# Patient Record
Sex: FEMALE | Race: WHITE | NOT HISPANIC OR LATINO | Employment: UNEMPLOYED | URBAN - METROPOLITAN AREA
[De-identification: names, ages, dates, MRNs, and addresses within clinical notes are randomized per-mention and may not be internally consistent; named-entity substitution may affect disease eponyms.]

---

## 2020-08-21 ENCOUNTER — HOSPITAL ENCOUNTER (OUTPATIENT)
Dept: OTHER | Facility: HOSPITAL | Age: 55
Discharge: HOME | End: 2020-08-21
Attending: NURSE PRACTITIONER

## 2020-09-21 ENCOUNTER — HOSPITAL ENCOUNTER (OUTPATIENT)
Dept: OTHER | Facility: HOSPITAL | Age: 55
Discharge: HOME | End: 2020-09-21
Attending: NURSE PRACTITIONER

## 2020-09-24 ENCOUNTER — HOSPITAL ENCOUNTER (OUTPATIENT)
Dept: PET IMAGING | Facility: HOSPITAL | Age: 55
Discharge: HOME | End: 2020-09-24
Attending: NURSE PRACTITIONER

## 2020-09-29 ENCOUNTER — OFFICE VISIT CONVERTED (OUTPATIENT)
Dept: PULMONOLOGY | Facility: CLINIC | Age: 55
End: 2020-09-29
Attending: INTERNAL MEDICINE

## 2021-05-28 VITALS
BODY MASS INDEX: 31.45 KG/M2 | RESPIRATION RATE: 10 BRPM | HEIGHT: 68 IN | TEMPERATURE: 97.9 F | HEART RATE: 88 BPM | DIASTOLIC BLOOD PRESSURE: 95 MMHG | SYSTOLIC BLOOD PRESSURE: 149 MMHG | WEIGHT: 207.5 LBS | OXYGEN SATURATION: 94 %

## 2021-05-28 NOTE — PROGRESS NOTES
Patient: YOLANDA WEST     Acct: [id number]     Report: #[id number]  UNIT #: [id number]     : 1965    Encounter Date:2020  PRIMARY CARE: GONZALO LOPEZ  ***Signed***  --------------------------------------------------------------------------------------------------------------------  Chief Complaint      Encounter Date      Sep 29, 2020            Primary Care Provider      GONZALO LOPEZ            Referring Provider      GONZALO LOPEZ            Patient Complaint      Patient is complaining of      New PT here today for 9 mm Pulmonary Nodule            VITALS      Height 5 ft 8 in / 172.72 cm      Weight 207 lbs 8 oz / 94.934902 kg      BSA 2.08 m2      BMI 31.5 kg/m2      Temperature 97.9 F / 36.61 C - Tympanic      Pulse 88      Respirations 10      Blood Pressure 149/95 Sitting, Left Arm      Pulse Oximetry 94%, room air            HPI      The patient is a 55 year old current every day smoker who presents to lung     clinic today for abnormal CT scan of the chest. Her primary care provider     ordered a noncontrast CT scan of the chest on 2020 showing a  9 mm nodule     in the left lung base. A follow up PET scan was ordered which was performed on     2020 showing non avid 0.8 X 0.8 lung nodule, emphysematous changes were     also seen. The patient is here today with her best friend, she is complaining of    feeling quite tired. She has not had a full work up for fatigue by her primary     care provider yet. She is due to see her shortly. The patient endorses excessive    daytime sleepiness and snoring but does not wake with a morning headaches or     fall asleep easily at red lights of when left alone watching TV.  She has a     chronic cough and shortness of breath with exertion, she is currently on no     inhaler regimen. She has no family history of lung cancer but father had     melanoma cancer and mother had brain cancer. She currently smokes 2 pack per     day,  she started smoking at age 15. She was able to successfully quit smoking     for 13 days with nicotine gum at one point and then restarted smoking.            ROS      Constitutional:  Complains of: Fatigue; Denies: Fever, Weight gain, Weight loss,    Chills, Insomnia, Other      Respiratory/Breathing:  Complains of: Shortness of air, Cough; Denies: Wheezing,    Hemoptysis, Pleuritic pain, Other      Endocrine:  Denies: Polydipsia, Polyuria, Heat/cold intolerance, Abnorml     menstrual pattern, Diabetes, Other      Eyes:  Denies: Blurred vision, Vision Changes, Other      Ears, nose, mouth, throat:  Complains of: Nasal or Sinus Pain, Nasal discharge,     Nasal congestion; Denies: Mouth lesions, Thrush, Throat pain, Hoarseness,     Allergies/Hay Fever, Post Nasal Drip, Headaches, Recent Head Injury, Nose     Bleeding, Neck Stiffness, Thyroid Mass, Hearing Loss, Ear Fullness, Dry Mouth,     Dry Lips, Other      Cardiovascular:  Complains of: Dyspnea on Exertion; Denies: Palpitations,     Syncope, Claudication, Chest Pain, Wake up Gasping for air, Leg Swelling,     Irregular Heart Rate, Cyanosis, Other      Gastrointestinal:  Complains of: Reflux/Heartburn; Denies: Nausea, Constipation,    Diarrhea, Abdominal pain, Vomiting, Difficulty Swallowing, Dysphagia, Jaundice,     Bloating, Melena, Bloody stools, Other      Genitourinary:  Complains of: Other (bladder leakage); Denies: Urinary     frequency, Incontinence, Hematuria, Urgency, Nocturia, Dysuria, Testicular     problems      Musculoskeletal:  Complains of: Joint Pain (Spinal Stenosis, back, knees);     Denies: Joint Stiffness, Joint Swelling, Myalgias, Other      Hematologic/lymphatic:  DENIES: Lymphadenopathy, Bruising, Bleeding tendencies,     Other      Neurological:  Denies: Headache, Numbness, Weakness, Seizures, Other      Psychiatric:  Complains of: Anxiety, Depression; Denies: Appropriate Effect,     Other      Sleep:  Yes: Excessive daytime sleep,  Snoring; No: Morning Headache?, Insomnia?,    Stop breathing at sleep?, Other      Integumentary:  Denies: Rash, Dry skin, Skin Warm to Touch, Other      Immunologic/Allergic:  Denies: Latex allergy, Seasonal allergies, Asthma,     Urticaria, Eczema, Other      Immunization status:  Up to date            FAMILY/SOCIAL/MEDICAL HX      Cancer/Type - Family Hx:  Mother (Brain), Father (Melanoma)      Is Father Still Living?:  No      Is Mother Still Living?:  No      Social History:  Tobacco Use; No Alcohol Use, No Recreational Drug use      Smoking status:  Current every day smoker (Pt started smoking at 15 yrs old, 2     ppd x 40 yrs)       Section:  Yes      Tubal Ligation:  No      Hysterectomy:  Yes      Anticoagulation Therapy:  No      Antibiotic Prophylaxis:  No      Medical History:  Yes: Depression, Anxiety, Reflux Disease, Shortness Of Breath,    Sinus Trouble      Psychiatric History      none            PREVENTION      Hx Influenza Vaccination:  Yes      Influenza Vaccine Declined:  No      2 or More Falls in Past Year?:  No      Fall Past Year with Injury?:  No      Hx Pneumococcal Vaccination:  No      Encouraged to follow-up with:  PCP regarding preventative exams.      Chart initiated by      Leatha Weller MA            ALLERGIES/MEDICATIONS      Allergies:        Coded Allergies:             Codeine (Verified  Allergy, Intermediate, 20)                  itching, rash, nausea           Morphine (Verified  Allergy, Intermediate, 20)                  itching, rash, nausea      Medications    Last Reconciled on 20 16:34 by KELLIE IYER DO      (prilosec otc) Unknown Strength  No Conflict Check               Reported         20       traZODone HCl (Desyrel) 50 Mg Tablet      100 MG PO HS, #60 TAB 0 Refills         Reported         20       Mirtazapine (Remeron) 15 Mg Tablet      30 MG PO QDAY, #60 TAB 0 Refills         Reported         20       clonazePAM (clonazePAM)  1 Mg Tablet      1 MG PO BID, #60 TAB 0 Refills         Reported         9/29/20       Ibuprofen (Ibuprofen) 800 Mg Tablet      800 MG PO TID, #90 TAB 0 Refills         Reported         9/29/20      Current Medications      Current Medications Reviewed 9/29/20            EXAM      VITAL SIGNS:  Reviewed.        NECK:  Supple without tracheal deviation or lymphadenopathy.  No thyromegaly     appreciated.      LYMPHATICS:  No cervical or supraclavicular lymphadenopathy.      HEENT: Pupils are equal, round and reactive to light. There is no scleral     icterus. There are bags around her eyes.    Nares patent without hypertrophy of     the turbinates. No erythema of the passages.  TMs are clear bilaterally with     good cone of light. The posterior pharynx is without  lesions or erythema.      RESPIRATORY: Right lower lobe decreased breath sounds otherwise clear to     auscultation.  No wheezes, rales or rhonchi.  Tympanic to percussion.        CARDIOVASCULAR:  Regular rate and rhythm.  No murmurs, gallops or rubs.  No     lower extremity edema.  Equal radial pulses.        GI: Soft, nontender, nondistended, no organomegaly.  Bowel sounds present in all    four quadrants.      MUSCULOSKELETAL:  No joint effusions, erythema or warmth over the major joint     systems.      SKIN:  No rashes or lesions.      NEUROLOGIC: Cranial nerves II-XII are intact bilaterally.  Moves all     extremities. Ambulates with ease.      PSYCH:  Appropriate mood and affect.      Vtials      Vitals:             Height 5 ft 8 in / 172.72 cm           Weight 207 lbs 8 oz / 94.329550 kg           BSA 2.08 m2           BMI 31.5 kg/m2           Temperature 97.9 F / 36.61 C - Tympanic           Pulse 88           Respirations 10           Blood Pressure 149/95 Sitting, Left Arm           Pulse Oximetry 94%, room air            REVIEW      Results Reviewed      PCCS Results Reviewed?:  Yes Prev Radiology Results      Radiographic Results                [facility]                PACS RADIOLOGY REPORT            Patient: YOLANDA WEST   Acct: #[id number]   Report: #[id number]            UNIT #: [id number]    DOS: 20 0844      INSURANCE:"[employer] [place]   ORDER #:[id number]      LOCATION:PET     : 1965            PROVIDERS      ADMITTING:     ATTENDING: GONZALO LOPEZ      FAMILY:  GONZALO LOPEZ   ORDERING:  GONZALO LOPEZ         OTHER:    DICTATING:  Abdulaziz Saldivar MD            REQ #:[id number]   EXAM:ISKBSMIDTH - SKULL BASE-MIDTHIGH INIT fdg      REASON FOR EXAM:  R91.1      REASON FOR VISIT:  R91.1            *******Signed******         PROCEDURE:   PET CT SKULL BASE TO MID THIGH INITIAL             COMPARISON:   None.             INDICATIONS:   SPN             TECHNIQUE:   After obtaining the patient's consent, F-18 FDG was administered     intravenously.  A PET       scan with concurrent CT imaging was performed from the skull to the thigh with     multiplanar imaging.             RADIONUCLIDE:     11.85 MCI   F18 FDG- I.V.      LABS:                          Blood Glucose 96 mg/dl      UPTAKE TIME:        53 mins      BACKGROUND UPTAKE:  Lung background demonstrates a maximum SUV of 0.9.             FINDINGS:         No abnormal activity in the neck.  There is a superficial subcutaneous cystic     lesion in the upper       central back measuring 1.8 centimeters which is not hypermetabolic.  This may be    a sebaceous cyst.        No hypermetabolic mediastinal adenopathy or hilar adenopathy.  There is     atherosclerosis in the       aortic arch.  No pericardial or pleural effusion.  Emphysematous changes.  There    is redemonstration       of 0.8 by 0.8 centimeter nodule in the left lower lobe.  Maximum SUV measures     about 0.7.  This is       not hypermetabolic.  There is motion artifact at the lung bases.             In the abdomen and pelvis, previous  hysterectomy.  Diverticulosis.  No     hypermetabolic activity is       seen.  Mild atherosclerosis.  No adenopathy.             CONCLUSION:         1. 0.8 x 0.8 centimeter nodule in the left lower lobe demonstrates no     significant metabolic       activity with a maximum SUV of 0.7.  No previous exams are available prior to     the CT scan from       2020.  Recommend continued follow-up in 3 months.      2. Other findings as above.              GRABIEL CLEMENTS MD             Electronically Signed and Approved By: GRABIEL CLEMENTS MD on 2020 at     14:40                               Until signed, this is an unconfirmed preliminary report that may contain      errors and is subject to change.                                              SCHJO:      D:20 1139                     [facility]                PACS RADIOLOGY REPORT            Patient: YOLANDA WEST   Acct: #[id number]   Report: #[id number]            UNIT #: [id number]    DOS: 20 1303      INSURANCE:[employer] [facility]   ORDER #:[id number]      LOCATION:BRAND     : 1965            PROVIDERS      ADMITTING:     ATTENDING: GONZALO LOPEZ      FAMILY:  NONE,MD   ORDERING:  GONZALO LOPEZ         OTHER:    DICTATING:  BENNY SALAS MD            REQ #:[id number]   [facility]      REASON FOR EXAM:  TOBACCO USER      REASON FOR VISIT:  TOBACCO USER            *******Signed******         PROCEDURE:   CT CHEST WITHOUT CONTRAST             COMPARISON:   None.             INDICATIONS:   TOBACCO USER 2 PACK PER DAY FOR YEARS             TECHNIQUE:   CT images were created without the administration of contrast     material.               PROTOCOL:     Standard imaging protocol performed                RADIATION:     DLP: 758mGy*cm          Automated exposure control was utilized to minimize radiation dose.               FINDINGS:         Mild centrilobular emphysema.  There is a 9 mm nodule in the medial left lung     base (image 64).  No       adenopathy in the chest.  No acute findings in the included upper abdomen.  No     aggressive appearing       bone change.             CONCLUSION:   9 mm nodule in the left lung base.  Recommend either PET-CT or     short interval follow-up       chest CT in 3 months.              BENNY SALAS MD             Electronically Signed and Approved By: BENNY SALAS MD on 9/21/2020 at 13:37                               Until signed, this is an unconfirmed preliminary report that may contain      errors and is subject to change.                                              BLAINEER:      D:09/21/20 1337            Assessment      Lung nodules - R91.8            Smoker - F17.200            Tiredness - R53.83            Snoring - R06.83            Obesity (BMI 30.0-34.9) - E66.9            Notes      New Medications      * Ibuprofen 800 MG TABLET: 800 MG PO TID #90      * clonazePAM 1 MG TABLET: 1 MG PO BID #60      * Mirtazapine (Remeron) 15 MG TABLET: 30 MG PO QDAY #60      * traZODone HCl (Desyrel) 50 MG TABLET: 100 MG PO HS #60      * (prilosec otc) Unknown Strength:       * MDI-Albuterol (Proair HFA) 8.5 GM HFA.AER.AD: 2 PUFFS INH Q6H PRN SHORTNESS OF      BREATH #1      * SPACER (Spacer) 1 EACH EACH: EACH XX ONCE #1         Instructions: Use as directed with inhalers      New Diagnostics      * Chest W/O Cont CT, 3 Months         Dx: Lung nodules - R91.8      * PFT-Comp, PrePost,DLCO,BodyBox, Routine         Dx: Lung nodules - R91.8      * 6 Min Walk w O2 Titration Test, Routine         Dx: Lung nodules - R91.8      * [facility] Pre-Op Covid Screening, As Soon As Possible         Dx: Lung nodules - R91.8      * Home Sleep Study, Routine         Dx: Tiredness - R53.83      New Office Procedures      * Flu Vacc Fluarix Quadrivalent, As Soon As Possible         Flu Vacc (6Mos Up)/Pf (Fluarix Quadrivalent  Syringe) 60 MCG/0.5 ML SYRINGE:        60 MICROGRAM INTRAMUSCULARLY Qty 1 SYRINGE      ASSESSMENT:      1. 9 mm left lower lobe lung nodule non PET avid.       2. Current everyday smoker of cigarettes, heavy smoker of 2 pack per day.        3. Tiredness.       4. Snoring.       5. Obesity with BMI 31.5.            PLAN:      1. I have ordered a 6 minute walk test, pulmonary function test and a     preoperative COVID-19 test for these procedures.       2. I have discussed the PET scan with the patient today and said that even     though the nodule is not PET avid she will need to have close follow up in 3     months with noncontrast CT scan of the chest which I will order today.       3. I will order a home  sleep study to further work up her tiredness and rule     out sleep apnea.       4. The patient was given a flu vaccine today.       5. I gave her 1-800 QUIT NOW number and pamphlet on smoking cessation efforts. I    asked her to start using nicotine patches which she has at home.       6. I have called in albuterol rescue inhaler 2 puffs every 4-6 hours as needed     for shortness of breath and cough. I have also provided a spacer.            Patient Education      Tobacco Cessation Counseling:  for under 3 minutes      Patient Education Provided:  COPD, How to use an Inhaler, Influenza, Lung     Cancer, Sleep Apnea, Smoking Cessation            Electronically signed by KELLIE IYER  10/01/2020 14:50       Disclaimer: Converted document may not contain table formatting or lab diagrams. Please see [employer] for the authenticated document.

## 2021-06-02 ENCOUNTER — TRANSCRIBE ORDERS (OUTPATIENT)
Dept: ADMINISTRATIVE | Facility: HOSPITAL | Age: 56
End: 2021-06-02

## 2021-06-02 DIAGNOSIS — Z12.31 ENCOUNTER FOR SCREENING MAMMOGRAM FOR MALIGNANT NEOPLASM OF BREAST: Primary | ICD-10-CM

## 2021-09-10 ENCOUNTER — OFFICE VISIT (OUTPATIENT)
Dept: ORTHOPEDIC SURGERY | Facility: CLINIC | Age: 56
End: 2021-09-10

## 2021-09-10 VITALS — BODY MASS INDEX: 29.62 KG/M2 | RESPIRATION RATE: 14 BRPM | WEIGHT: 200 LBS | HEIGHT: 69 IN

## 2021-09-10 DIAGNOSIS — M70.61 TROCHANTERIC BURSITIS OF RIGHT HIP: ICD-10-CM

## 2021-09-10 DIAGNOSIS — M25.551 RIGHT HIP PAIN: Primary | ICD-10-CM

## 2021-09-10 PROCEDURE — 20610 DRAIN/INJ JOINT/BURSA W/O US: CPT | Performed by: ORTHOPAEDIC SURGERY

## 2021-09-10 PROCEDURE — 99203 OFFICE O/P NEW LOW 30 MIN: CPT | Performed by: ORTHOPAEDIC SURGERY

## 2021-09-10 RX ORDER — OMEPRAZOLE 20 MG/1
20 CAPSULE, DELAYED RELEASE ORAL DAILY
COMMUNITY

## 2021-09-10 RX ORDER — CLONAZEPAM 1 MG/1
TABLET ORAL
COMMUNITY
Start: 2021-08-24

## 2021-09-10 RX ORDER — ERGOCALCIFEROL 1.25 MG/1
CAPSULE ORAL
COMMUNITY
Start: 2021-08-10

## 2021-09-10 RX ORDER — TRAZODONE HYDROCHLORIDE 100 MG/1
100 TABLET ORAL DAILY
COMMUNITY

## 2021-09-10 RX ORDER — ATORVASTATIN CALCIUM 20 MG/1
TABLET, FILM COATED ORAL
COMMUNITY
Start: 2021-08-24

## 2021-09-10 RX ORDER — LISINOPRIL 20 MG/1
TABLET ORAL
COMMUNITY
Start: 2021-08-19

## 2021-09-10 RX ORDER — MIRTAZAPINE 30 MG/1
TABLET, FILM COATED ORAL
COMMUNITY
Start: 2021-06-14

## 2021-09-10 RX ORDER — OXYBUTYNIN CHLORIDE 5 MG/1
TABLET ORAL
COMMUNITY
Start: 2021-06-14

## 2021-09-10 RX ORDER — IBUPROFEN 800 MG/1
800 TABLET ORAL
COMMUNITY

## 2021-09-10 RX ADMIN — LIDOCAINE HYDROCHLORIDE 9 ML: 10 INJECTION, SOLUTION INFILTRATION; PERINEURAL at 11:02

## 2021-09-10 RX ADMIN — METHYLPREDNISOLONE ACETATE 80 MG: 80 INJECTION, SUSPENSION INTRA-ARTICULAR; INTRALESIONAL; INTRAMUSCULAR; SOFT TISSUE at 11:02

## 2021-09-10 NOTE — PROGRESS NOTES
"Chief Complaint  Pain of the Right Hip     Subjective      Nikki Castle presents to [facility] for follow up evaluation of the right hip. The patient reports she has been seeing pain management for her back. She has been having pain for about 7 years. She has no injury or trauma. She reports the pain radiates down her leg. She has had injections in her back previously. She reports pain is worse at night. She takes ibuprofen for pain. She denies groin pain.     Allergies   Allergen Reactions   • Codeine Itching   • Morphine Itching        Social History     Socioeconomic History   • Marital status: Unknown     Spouse name: Not on file   • Number of children: Not on file   • Years of education: Not on file   • Highest education level: Not on file   Tobacco Use   • Smoking status: Current Every Day Smoker   • Smokeless tobacco: Never Used        Review of Systems     Objective   Vital Signs:   Resp 14   Ht 174 cm (68.5\")   Wt 90.7 kg (200 lb)   BMI 29.97 kg/m²       Physical Exam  Constitutional:       Appearance: Normal appearance. The patient is well-developed and normal weight.   HENT:      Head: Normocephalic.      Right Ear: Hearing and external ear normal.      Left Ear: Hearing and external ear normal.      Nose: Nose normal.   Eyes:      Conjunctiva/sclera: Conjunctivae normal.   Cardiovascular:      Rate and Rhythm: Normal rate.   Pulmonary:      Effort: Pulmonary effort is normal.      Breath sounds: No wheezing or rales.   Abdominal:      Palpations: Abdomen is soft.      Tenderness: There is no abdominal tenderness.   Musculoskeletal:      Cervical back: Normal range of motion.   Skin:     Findings: No rash.   Neurological:      Mental Status: The patient is alert and oriented to person, place, and time.   Psychiatric:         Mood and Affect: Mood and affect normal.         Judgment: Judgment normal.       Ortho Exam      Right hip- flexion 85. Full extension. Pain with " straight leg raise. External Rotation 40. Internal rotation 20. Trochanteric tenderness. positive ted. Neurovascularly intact. Positive EHL, FHL, GS and TA. Sensation intact to all 5 nerves of the foot. Positive pulses.     Large Joint Arthrocentesis: R greater trochanteric bursa  Date/Time: 9/10/2021 11:02 AM  Consent given by: patient  Site marked: site marked  Timeout: Immediately prior to procedure a time out was called to verify the correct patient, procedure, equipment, support staff and site/side marked as required   Supporting Documentation  Indications: pain   Procedure Details  Location: hip - R greater trochanteric bursa  Needle gauge: 21 g.  Medications administered: 9 mL lidocaine 1 %; 80 mg methylPREDNISolone acetate 80 MG/ML  Patient tolerance: patient tolerated the procedure well with no immediate complications          X-Ray Report:  Right hip(s) X-Ray  Indication: Evaluation of Right hip pain  AP and Lateral view(s)  Findings: no acute fracture. Small enthesophyte to greater trochanter. Hip joint space preserved.   Prior studies available for comparison: no           Imaging Results (Most Recent)     None           Result Review :       No results found.           Assessment and Plan     DX: trochanteric bursitis, right hip    Discussed the risks and benefits of a right hip bursa injection. The patient expressed understanding and wished to proceed. She tolerated the injection well. Home exercises given today.     Call or return if worsening symptoms.    Follow Up     6 weeks.   I have personally performed the services described in this document as scribed by the above individual and it is both accurate and complete. Juan Romero MD 09/12/21     Patient was given instructions and counseling regarding her condition or for health maintenance advice. Please see specific information pulled into the AVS if appropriate.     Scribed for Juan Romero MD by Amy Levine.  09/10/21    10:18 EDT

## 2021-09-12 RX ORDER — METHYLPREDNISOLONE ACETATE 80 MG/ML
80 INJECTION, SUSPENSION INTRA-ARTICULAR; INTRALESIONAL; INTRAMUSCULAR; SOFT TISSUE
Status: COMPLETED | OUTPATIENT
Start: 2021-09-10 | End: 2021-09-10

## 2021-09-12 RX ORDER — LIDOCAINE HYDROCHLORIDE 10 MG/ML
9 INJECTION, SOLUTION INFILTRATION; PERINEURAL
Status: COMPLETED | OUTPATIENT
Start: 2021-09-10 | End: 2021-09-10

## 2023-09-14 ENCOUNTER — OFFICE VISIT (OUTPATIENT)
Dept: PULMONOLOGY | Facility: CLINIC | Age: 58
End: 2023-09-14
Payer: MEDICARE

## 2023-09-14 ENCOUNTER — HOSPITAL ENCOUNTER (OUTPATIENT)
Dept: GENERAL RADIOLOGY | Facility: HOSPITAL | Age: 58
Discharge: HOME | End: 2023-09-14
Admitting: INTERNAL MEDICINE
Payer: MEDICARE

## 2023-09-14 VITALS
DIASTOLIC BLOOD PRESSURE: 96 MMHG | WEIGHT: 184.2 LBS | HEART RATE: 99 BPM | BODY MASS INDEX: 27.92 KG/M2 | TEMPERATURE: 98 F | HEIGHT: 68 IN | RESPIRATION RATE: 16 BRPM | SYSTOLIC BLOOD PRESSURE: 138 MMHG | OXYGEN SATURATION: 92 %

## 2023-09-14 DIAGNOSIS — Z23 FLU VACCINE NEED: Primary | ICD-10-CM

## 2023-09-14 DIAGNOSIS — Z23 FLU VACCINE NEED: ICD-10-CM

## 2023-09-14 DIAGNOSIS — J90 PLEURAL EFFUSION: ICD-10-CM

## 2023-09-14 PROCEDURE — 71046 X-RAY EXAM CHEST 2 VIEWS: CPT

## 2023-09-14 RX ORDER — VARENICLINE TARTRATE 1 MG/1
0.5 TABLET, FILM COATED ORAL 2 TIMES DAILY
Qty: 60 TABLET | Refills: 4 | Status: SHIPPED | OUTPATIENT
Start: 2023-09-14

## 2023-09-14 RX ORDER — GABAPENTIN 800 MG/1
TABLET ORAL
COMMUNITY

## 2023-09-14 RX ORDER — ALBUTEROL SULFATE 90 UG/1
AEROSOL, METERED RESPIRATORY (INHALATION)
COMMUNITY

## 2023-09-14 RX ORDER — ROSUVASTATIN CALCIUM 10 MG/1
1 TABLET, COATED ORAL DAILY
COMMUNITY

## 2023-09-14 RX ORDER — TRAMADOL HYDROCHLORIDE 50 MG/1
TABLET ORAL
COMMUNITY

## 2023-09-14 NOTE — PROGRESS NOTES
CHIEF COMPLAINT    Primary Care Provider  Carrie Dorsey APRN     Referring Provider  WHITNEY Armstrogn    Patient Complaint  Pleural Effusion, Shortness of Breath, Wheezing, Cough, and Establish Care (New pt here for chest ct results/Pleural effusion)        Subjective          Nikki Castle presents to [facility] PULMONARY & CRITICAL CARE MEDICINE  History of Present Illness  Nikki Castle is a 58 y.o. female who has left moderate pleural effusion.  She was referred to me by WHITNEY Armstrong.  She is a chronic smoker smoking 2 packs a day for more than 45 years.  She had low-dose lung cancer screening CT scan of the chest in 2023.  Showed moderate left-sided pleural effusion.  She has shortness of breath with activities.  She has intermittent cough with scant phlegm production.  Has no significant changes in weight or appetite.  No nausea or vomiting.  She has been on as needed albuterol which she uses around once or twice a day.  She has tried nicotine patch, Wellbutrin and Chantix in the past.  Chantix caused her to have mood swings but is willing to go back on it again.  She has not had recent hospitalization for pneumonia or bronchitis.  She has no changes in weight or appetite.  Her both parents  of cancer.  Her mom was diagnosed with brain cancer, and does not know if it started somewhere else.  She  when she was in 50s.  Her father also had unknown cancer and  of it.  She is not able to go up and down the stairs.  Has restriction of activities.  I reviewed the CT scan result with her.  She is willing to try to quit smoking with Chantix    Tobacco Use: High Risk    Smoking Tobacco Use: Every Day    Smokeless Tobacco Use: Never    Passive Exposure: Past   .    Review of Systems     General:  No Fatigue, No Fever No weight loss or loss of appetite  HEENT: No dysphagia, No Visual Changes, no rhinorrhea  Respiratory:  + cough,+Dyspnea, intermittent phlegm, No  Pleuritic Pain, no wheezing, no hemoptysis.  Cardiovascular: Denies chest pain, denies palpitations,+JOHNSON, No Chest Pressure  Gastrointestinal:  No Abdominal Pain, No Nausea, No Vomiting, No Diarrhea  Genitourinary:  No Dysuria, No Frequency, No Hesitancy  Musculoskeletal: No muscle pain or swelling  Endocrine:  No Heat Intolerance, No Cold Intolerance  Hematologic:  No Bleeding, No Bruising  Psychiatric:  No Anxiety, No Depression  Neurologic:  No Confusion, no Dysarthria, No Headaches  Skin:  No Rash, No Open Wounds    Family History   Problem Relation Age of Onset    Cancer Mother         Brain tumor    Hypertension Father     Cancer Father         unknown        Social History     Socioeconomic History    Marital status:    Tobacco Use    Smoking status: Every Day     Packs/day: 2.00     Years: 41.00     Pack years: 82.00     Types: Cigarettes     Passive exposure: Past    Smokeless tobacco: Never   Vaping Use    Vaping Use: Never used   Substance and Sexual Activity    Alcohol use: Never    Drug use: Never    Sexual activity: Defer        Past Medical History:   Diagnosis Date    High blood pressure     High cholesterol         Immunization History   Administered Date(s) Administered    COVID-19 (MODERNA) 1st,2nd,3rd Dose Monovalent 05/14/2021, 06/23/2021    Fluzone (or Fluarix & Flulaval for VFC) >6mos 09/29/2020         Allergies   Allergen Reactions    Codeine Itching    Morphine Itching          Current Outpatient Medications:     albuterol sulfate  (90 Base) MCG/ACT inhaler, Inhale 2 puffs every 4 hours by inhalation route as needed for 30 days., Disp: , Rfl:     clonazePAM (KlonoPIN) 1 MG tablet, , Disp: , Rfl:     gabapentin (NEURONTIN) 800 MG tablet, take 1 tablet (800 mg) by mouth 3 times per day, Disp: , Rfl:     ibuprofen (ADVIL,MOTRIN) 800 MG tablet, Take 1 tablet by mouth., Disp: , Rfl:     lisinopril (PRINIVIL,ZESTRIL) 20 MG tablet, , Disp: , Rfl:     mirtazapine (REMERON) 30 MG  "tablet, , Disp: , Rfl:     omeprazole (priLOSEC) 20 MG capsule, Take 1 capsule by mouth Daily., Disp: , Rfl:     rosuvastatin (CRESTOR) 10 MG tablet, Take 1 tablet by mouth Daily., Disp: , Rfl:     traMADol (ULTRAM) 50 MG tablet, TAKE (1) TABLET EVERY 8 HOURS AS NEEDED, Disp: , Rfl:     traZODone (DESYREL) 100 MG tablet, Take 1 tablet by mouth Daily., Disp: , Rfl:     Fluticasone-Umeclidin-Vilant 200-62.5-25 MCG/ACT aerosol powder , Inhale 1 puff Daily., Disp: 1 each, Rfl: 3    varenicline (CHANTIX) 1 MG tablet, Take 0.5 tablets by mouth 2 (Two) Times a Day., Disp: 60 tablet, Rfl: 4     Objective   Vital Signs:   /96 (BP Location: Left arm, Patient Position: Sitting, Cuff Size: Adult)   Pulse 99   Temp 98 °F (36.7 °C) (Tympanic)   Resp 16   Ht 172.7 cm (68\")   Wt 83.6 kg (184 lb 3.2 oz)   SpO2 92% Comment: room air  BMI 28.01 kg/m²   Mallampatti classification : 1  Physical Exam      Vital Signs Reviewed  WDWN, Alert, in mild distress, has conversational dyspnea, smelling of nicotine poor  HEENT:  PERRL, EOMI.  OP, nares clear, no sinus tenderness  Neck:  Supple, no JVD, no thyromegaly  Lymph: no axillary, cervical, supraclavicular lymphadenopathy noted bilaterally  Chest:  good aeration, bilateral diminished breath sounds, no wheezing, crackles or rhonchi, dull to percussion left lung base   CV: RRR, no MGR, pulses 2+, equal.  Abd:  Soft, NT, ND, + BS, no HSM  EXT:  no clubbing, no cyanosis, No BLE edema  Neuro:  A&Ox3, CN grossly intact, no focal deficits.  Skin: No rashes or lesions noted     Result Review :   The following data was reviewed by: Perry French MD on 09/14/2023:        Data reviewed : Radiologic studies I reviewed the CT scan of the chest from 7/17/2023 done at [facility].     CT scan of the chest done 7/17/2023 showed scattered 2 to 3 mm noncalcified bilateral pulmonary nodules in the upper lobes.  Moderate left basilar pleural effusion with suspected rounded " atelectasis in the left lower lobe was seen.  There was mild peripheral atelectasis seen in the lingular segment and left upper lobe as well.       Assessment and Plan    Diagnoses and all orders for this visit:    1. Flu vaccine need (Primary)  -     Fluzone >6 Months (2370-6782)  -     No Thoracentesis Labs Needed; Standing  -     US Thoracentesis Left; Future  -     XR Chest 2 View; Future  -     Complete PFT - Pre & Post Bronchodilator; Future  -     6 Minute Walk Test; Future  -     varenicline (CHANTIX) 1 MG tablet; Take 0.5 tablets by mouth 2 (Two) Times a Day.  Dispense: 60 tablet; Refill: 4  -     Fluticasone-Umeclidin-Vilant 200-62.5-25 MCG/ACT aerosol powder ; Inhale 1 puff Daily.  Dispense: 1 each; Refill: 3    2. Pleural effusion  -     No Thoracentesis Labs Needed; Standing  -     US Thoracentesis Left; Future  -     XR Chest 2 View; Future  -     Complete PFT - Pre & Post Bronchodilator; Future  -     6 Minute Walk Test; Future  -     varenicline (CHANTIX) 1 MG tablet; Take 0.5 tablets by mouth 2 (Two) Times a Day.  Dispense: 60 tablet; Refill: 4  -     Fluticasone-Umeclidin-Vilant 200-62.5-25 MCG/ACT aerosol powder ; Inhale 1 puff Daily.  Dispense: 1 each; Refill: 3      Pleural effusion: Left-sided moderate pleural effusion.  Discussed thoracentesis with her and her partner.  Risk and benefits of procedure were discussed in detail.  Risk including pneumothorax, pneumonia, bleeding, respiratory patient and that it could be fatal were all reviewed.  She is agreeable.  Check chest x-ray PA and lateral view today.  We will preemptively schedule her for thoracentesis tomorrow.    COPD: Check pulmonary function test and 6-minute walk test with  She needs to quit smoking.  Start Trelegy Ellipta once daily.  Use albuterol as needed    History of lung nodule: There was mention of left lung nodule.  However the last CT scan showed 2 to 3 mm lung nodules.  Likely will need CT scan of the chest after  thoracentesis.    Smoking cessation counseling was done for more than 5 minutes.  She is willing to try Chantix.  Have sent the prescription for Chantix today    We will administer Flu vaccine today.      Follow Up   Return in about 3 weeks (around 10/5/2023).  Patient was given instructions and counseling regarding her condition or for health maintenance advice. Please see specific information pulled into the AVS if appropriate.        Electronically signed by Perry French MD, 9/14/2023, 09:40 EDT.

## 2023-09-15 ENCOUNTER — HOSPITAL ENCOUNTER (OUTPATIENT)
Dept: INFUSION THERAPY | Facility: HOSPITAL | Age: 58
Discharge: HOME | End: 2023-09-15
Attending: INTERNAL MEDICINE | Admitting: INTERNAL MEDICINE
Payer: MEDICARE

## 2023-09-15 ENCOUNTER — APPOINTMENT (OUTPATIENT)
Dept: CT IMAGING | Facility: HOSPITAL | Age: 58
End: 2023-09-15
Payer: MEDICARE

## 2023-09-15 VITALS
DIASTOLIC BLOOD PRESSURE: 92 MMHG | TEMPERATURE: 99 F | OXYGEN SATURATION: 92 % | SYSTOLIC BLOOD PRESSURE: 122 MMHG | HEART RATE: 95 BPM

## 2023-09-15 DIAGNOSIS — J90 PLEURAL EFFUSION: ICD-10-CM

## 2023-09-15 DIAGNOSIS — Z23 FLU VACCINE NEED: ICD-10-CM

## 2023-09-15 LAB
ALBUMIN FLD-MCNC: 2.1 G/DL
APPEARANCE FLD: ABNORMAL
COLOR FLD: ABNORMAL
CREAT BLDA-MCNC: 0.7 MG/DL
CREAT SERPL-MCNC: 0.6 MG/DL (ref 0.57–1)
EGFRCR SERPLBLD CKD-EPI 2021: 101.6 ML/MIN/1.73
EGFRCR SERPLBLD CKD-EPI 2021: 105.5 ML/MIN/1.73
GLUCOSE FLD-MCNC: 78 MG/DL
LDH FLD-CCNC: 273 U/L
LYMPHOCYTES NFR FLD MANUAL: 61 %
MACROPHAGE FLUID: 28 %
MONOCYTES NFR FLD: 11 %
NUC CELL # FLD: 786 /MM3
PH FLD: 8 [PH]
PROT FLD-MCNC: 3.4 G/DL
RBC # FLD AUTO: ABNORMAL /MM3

## 2023-09-15 PROCEDURE — 82042 OTHER SOURCE ALBUMIN QUAN EA: CPT | Performed by: INTERNAL MEDICINE

## 2023-09-15 PROCEDURE — 82565 ASSAY OF CREATININE: CPT

## 2023-09-15 PROCEDURE — 32555 ASPIRATE PLEURA W/ IMAGING: CPT | Performed by: INTERNAL MEDICINE

## 2023-09-15 PROCEDURE — 87116 MYCOBACTERIA CULTURE: CPT | Performed by: INTERNAL MEDICINE

## 2023-09-15 PROCEDURE — 83615 LACTATE (LD) (LDH) ENZYME: CPT | Performed by: INTERNAL MEDICINE

## 2023-09-15 PROCEDURE — 87070 CULTURE OTHR SPECIMN AEROBIC: CPT | Performed by: INTERNAL MEDICINE

## 2023-09-15 PROCEDURE — 82945 GLUCOSE OTHER FLUID: CPT | Performed by: INTERNAL MEDICINE

## 2023-09-15 PROCEDURE — 84157 ASSAY OF PROTEIN OTHER: CPT | Performed by: INTERNAL MEDICINE

## 2023-09-15 PROCEDURE — 89051 BODY FLUID CELL COUNT: CPT | Performed by: INTERNAL MEDICINE

## 2023-09-15 PROCEDURE — 87206 SMEAR FLUORESCENT/ACID STAI: CPT | Performed by: INTERNAL MEDICINE

## 2023-09-15 PROCEDURE — 88108 CYTOPATH CONCENTRATE TECH: CPT | Performed by: INTERNAL MEDICINE

## 2023-09-15 PROCEDURE — 87205 SMEAR GRAM STAIN: CPT | Performed by: INTERNAL MEDICINE

## 2023-09-15 PROCEDURE — 87075 CULTR BACTERIA EXCEPT BLOOD: CPT | Performed by: INTERNAL MEDICINE

## 2023-09-15 PROCEDURE — 25510000001 IOPAMIDOL PER 1 ML: Performed by: INTERNAL MEDICINE

## 2023-09-15 PROCEDURE — 88305 TISSUE EXAM BY PATHOLOGIST: CPT | Performed by: INTERNAL MEDICINE

## 2023-09-15 PROCEDURE — 82565 ASSAY OF CREATININE: CPT | Performed by: INTERNAL MEDICINE

## 2023-09-15 PROCEDURE — 83986 ASSAY PH BODY FLUID NOS: CPT | Performed by: INTERNAL MEDICINE

## 2023-09-15 PROCEDURE — 71260 CT THORAX DX C+: CPT

## 2023-09-15 PROCEDURE — 87102 FUNGUS ISOLATION CULTURE: CPT | Performed by: INTERNAL MEDICINE

## 2023-09-15 RX ADMIN — IOPAMIDOL 100 ML: 755 INJECTION, SOLUTION INTRAVENOUS at 15:43

## 2023-09-15 NOTE — PROCEDURES
Bedside thoracic ultrasound:    Right showed B lines, curtain sign seen.    Left side showed small to moderate effusion with anechoic space between lung and diaphragm.     Site marked for thoracentesis.    Images stored online.

## 2023-09-15 NOTE — NURSING NOTE
Patient underwent thoracentesis.  Tolerated well.  150 mL of fluid removed.  Fluid to be sent to lab for analysis.  Discharge home pending chest x-ray.

## 2023-09-15 NOTE — PROCEDURES
Thoracentesis Procedure Note    Indication: Small left sided pleural effusion    Consent: Yes, placed in chart.    Procedure: The patient was placed in the sitting position and the appropriate landmarks were identified. The skin over the puncture site in the left posterior chest wall was prepped with ChloraPrep and draped in sterile fashion. Local anesthesia was applied with 1% lidocaine. Thoracentesis catheter was inserted with needle guidance. Pleural fluid was reddish-yellow, drained 150 mL fluid. The catheter was then withdrawn and a sterile dressing was placed over the site. A post-procedure film is pending at this time.    The patient tolerated the procedure well.    Complications: None    Electronically signed by Perry French MD, 09/15/23, 2:50 PM EDT.

## 2023-09-16 LAB — NIGHT BLUE STAIN TISS: NORMAL

## 2023-09-17 ENCOUNTER — NURSE TRIAGE (OUTPATIENT)
Dept: CALL CENTER | Facility: HOSPITAL | Age: 58
End: 2023-09-17
Payer: MEDICARE

## 2023-09-17 NOTE — TELEPHONE ENCOUNTER
"Reason for Disposition   [1] Caller requesting NON-URGENT health information AND [2] PCP's office is the best resource    Additional Information   Negative: [1] Caller is not with the adult (patient) AND [2] reporting urgent symptoms   Negative: Lab result questions   Negative: Medication questions   Negative: Caller can't be reached by phone   Negative: Caller has already spoken to PCP or another triager   Negative: RN needs further essential information from caller in order to complete triage   Negative: Requesting regular office appointment    Answer Assessment - Initial Assessment Questions  1. REASON FOR CALL or QUESTION: \"What is your reason for calling today?\" or \"How can I best help you?\" or \"What question do you have that I can help answer?\"      Wanted to know about my chart diagnosis, instructed to call PCP provider in am    Protocols used: Information Only Call - No Triage-ADULT-    "

## 2023-09-18 ENCOUNTER — PATIENT ROUNDING (BHMG ONLY) (OUTPATIENT)
Dept: PULMONOLOGY | Facility: CLINIC | Age: 58
End: 2023-09-18
Payer: MEDICARE

## 2023-09-18 LAB
BACTERIA FLD CULT: NORMAL
GRAM STN SPEC: NORMAL

## 2023-09-18 NOTE — PROGRESS NOTES
September 18, 2023    Hello, may I speak with Nikki Castle?    My name is Mya      I am  with [facility] PULMONARY & CRITICAL CARE MEDICINE  [address]  [address]  [place] [address] [phone].    Before we get started may I verify your date of birth? 1965    I am calling to officially welcome you to our practice and ask about your recent visit. Is this a good time to talk?   My chart message sent for patient rounding.

## 2023-09-19 LAB
CYTO UR: NORMAL
LAB AP CASE REPORT: NORMAL
LAB AP CLINICAL INFORMATION: NORMAL
PATH REPORT.FINAL DX SPEC: NORMAL
PATH REPORT.GROSS SPEC: NORMAL

## 2023-09-20 LAB — BACTERIA SPEC ANAEROBE CULT: NORMAL

## 2023-09-22 LAB
FUNGUS WND CULT: NORMAL
MYCOBACTERIUM SPEC CULT: NORMAL
NIGHT BLUE STAIN TISS: NORMAL

## 2023-09-29 LAB
FUNGUS WND CULT: NORMAL
MYCOBACTERIUM SPEC CULT: NORMAL
NIGHT BLUE STAIN TISS: NORMAL

## 2023-10-02 ENCOUNTER — PROCEDURE VISIT (OUTPATIENT)
Dept: CARDIAC REHAB | Facility: HOSPITAL | Age: 58
End: 2023-10-02
Payer: MEDICARE

## 2023-10-02 ENCOUNTER — HOSPITAL ENCOUNTER (OUTPATIENT)
Dept: RESPIRATORY THERAPY | Facility: HOSPITAL | Age: 58
Discharge: HOME | End: 2023-10-02
Payer: MEDICARE

## 2023-10-02 DIAGNOSIS — Z23 FLU VACCINE NEED: ICD-10-CM

## 2023-10-02 DIAGNOSIS — J90 PLEURAL EFFUSION: ICD-10-CM

## 2023-10-02 PROCEDURE — 94010 BREATHING CAPACITY TEST: CPT

## 2023-10-02 PROCEDURE — 94729 DIFFUSING CAPACITY: CPT

## 2023-10-02 PROCEDURE — 94726 PLETHYSMOGRAPHY LUNG VOLUMES: CPT

## 2023-10-02 PROCEDURE — 94618 PULMONARY STRESS TESTING: CPT

## 2023-10-02 RX ORDER — ALBUTEROL SULFATE 2.5 MG/3ML
2.5 SOLUTION RESPIRATORY (INHALATION) ONCE
Status: COMPLETED | OUTPATIENT
Start: 2023-10-02 | End: 2023-10-02

## 2023-10-02 RX ADMIN — ALBUTEROL SULFATE 2.5 MG: 2.5 SOLUTION RESPIRATORY (INHALATION) at 12:05

## 2023-10-02 NOTE — PROGRESS NOTES
Primary Care Provider  Veronique Skinner APRN   Referring Provider  No ref. provider found    Patient Complaint  Follow-up, Wheezing, Cough, and Shortness of Breath      SUBJECTIVE    History of Presenting Illness  Nikki Castle is a pleasant 58 y.o. female patient of Dr. French's who presents to [facility] PULMONARY & CRITICAL CARE MEDICINE for follow-up appointment.  Patient here with significant other.  Patient last saw Dr. French 9/14/2023.  She had a CT scan that showed moderate left-sided pleural effusion.  She is a chronic smoker with a 90-pack-year history. Patient underwent thoracentesis by Dr. French on 9/15/2023.  Cytology has returned negative for malignant cells cultures with no growth to date.  Patient underwent pulmonary function test and 6-minute walk 10/2/2023.  Patient passed 6-minute walk on room air.  PFT results are still pending.  Patient states she was given Chantix by Dr. French and is planning to start Chantix tomorrow to help her with smoking cessation.  At present time patient denies having dyspnea, coughing, wheezing, headaches, chest pain, weight loss or hemoptysis. Denies fevers, chills and night sweats. Nikki Castle is able to perform ADLs without difficulties and denies any swollen glands/lymph nodes in the head or neck.  She continues to be on Trelegy 200 and albuterol as needed.    I have personally reviewed the review of systems, past family, social, medical and surgical histories; and agree with their findings.    Review of Systems  Constitutional symptoms:  Denied complaints   Ear, nose, throat: Denied complaints  Cardiovascular:  Denied complaints  Respiratory: Denied complaints  Gastrointestinal: Denied complaints  Musculoskeletal: Denied complaints    Family History   Problem Relation Age of Onset    Cancer Mother         Brain tumor    Hypertension Father     Cancer Father         unknown        Social History     Socioeconomic History    Marital status:     Tobacco Use    Smoking status: Every Day     Packs/day: 2.00     Years: 41.00     Pack years: 82.00     Types: Cigarettes     Passive exposure: Past    Smokeless tobacco: Never   Vaping Use    Vaping Use: Never used   Substance and Sexual Activity    Alcohol use: Never    Drug use: Never    Sexual activity: Defer        Past Medical History:   Diagnosis Date    High blood pressure     High cholesterol         Immunization History   Administered Date(s) Administered    COVID-19 (MODERNA) 1st,2nd,3rd Dose Monovalent 05/14/2021, 06/23/2021    Fluzone (or Fluarix & Flulaval for VFC) >6mos 09/29/2020, 09/14/2023       Allergies   Allergen Reactions    Codeine Itching    Morphine Itching          Current Outpatient Medications:     albuterol sulfate  (90 Base) MCG/ACT inhaler, Inhale 2 puffs every 4 hours by inhalation route as needed for 30 days., Disp: , Rfl:     clonazePAM (KlonoPIN) 1 MG tablet, , Disp: , Rfl:     Fluticasone-Umeclidin-Vilant 200-62.5-25 MCG/ACT aerosol powder , Inhale 1 puff Daily., Disp: 1 each, Rfl: 3    gabapentin (NEURONTIN) 800 MG tablet, take 1 tablet (800 mg) by mouth 3 times per day, Disp: , Rfl:     ibuprofen (ADVIL,MOTRIN) 800 MG tablet, Take 1 tablet by mouth., Disp: , Rfl:     lisinopril (PRINIVIL,ZESTRIL) 20 MG tablet, , Disp: , Rfl:     mirtazapine (REMERON) 30 MG tablet, , Disp: , Rfl:     omeprazole (priLOSEC) 20 MG capsule, Take 1 capsule by mouth Daily., Disp: , Rfl:     rosuvastatin (CRESTOR) 10 MG tablet, Take 1 tablet by mouth Daily., Disp: , Rfl:     traMADol (ULTRAM) 50 MG tablet, TAKE (1) TABLET EVERY 8 HOURS AS NEEDED, Disp: , Rfl:     traZODone (DESYREL) 100 MG tablet, Take 1 tablet by mouth Daily., Disp: , Rfl:     varenicline (CHANTIX) 1 MG tablet, Take 0.5 tablets by mouth 2 (Two) Times a Day., Disp: 60 tablet, Rfl: 4           Vital Signs   /92 (BP Location: Right arm, Patient Position: Sitting, Cuff Size: Adult)   Pulse 100   Temp 97.7 °F (36.5  "°C) (Temporal)   Resp 18   Ht 172.7 cm (68\")   Wt 84.3 kg (185 lb 12.8 oz)   SpO2 94% Comment: room air  BMI 28.25 kg/m²       OBJECTIVE    Physical Exam  Vital Signs Reviewed   WDWN, Alert, NAD.    HEENT:  PERRL, EOMI.  OP, nares clear  Neck:  Supple, no JVD, no thyromegaly  Chest:  good aeration, clear to auscultation bilaterally, tympanic to percussion bilaterally, no work of breathing noted  CV: RRR, no MGR, pulses 2+, equal.  Abd:  Soft, NT, ND, + BS, no HSM  EXT:  no clubbing, no cyanosis, no edema  Neuro:  A&Ox3, CN grossly intact, no focal deficits.  Skin: No rashes or lesions noted    Results Review  I have personally reviewed the prior office notes, hospital records, labs, and diagnostics.         File Link    Scan on 10/2/2023 1158 by Yecenia Dupree MA: Six Minute Walk Assessment        Key Information    Document ID File Type Document Type Description   [id number] Image PULMONARY RESULTS - SCAN Six Minute Walk Assessment     Import Information    Attached At Date Time User Dept   Order Level 10/2/2023 11:58 AM Yecenia Dupree MA  [facility]     Order    Six Minute Walk Test [[id number]]     Encounter    Procedure visit on 10/2/23 with  [facility], WALK TEST   Non-gynecologic Cytology: [id number]  Order: [id number]  Status: Final result       Visible to patient: Yes (not seen)       Next appt: 10/06/2023 at 11:00 AM in Pulmonology (WHITNEY Ortiz)    Specimen Information: Pleural Cavity; Pleural Fluid   0 Result Notes      Component    Case Report   Medical Cytology Report                           Case: [id number]                                   Authorizing Provider:  Perry French MD         Collected:           09/15/2023 03:03 PM           Ordering Location:     [place] [address]    Received:            09/18/2023 08:35 AM                                  [facility]                                                     Pathologist:           Tonio Grier MD    "                                                          Specimen:    Pleural Cavity                                                                            Final Diagnosis   Pleural fluid, left, thoracentesis:                - Negative for malignant cells      Electronically signed by Tonio Grier MD on 9/19/2023 at 1102   Clinical Information    Left pleural effusion.   Gross Description    1. Pleural Cavity.  Pleural fluid - left                 100 cc tab, fibrinous fluid received (1 cytospin prep and a cell block prepared).   Microscopic Description    Microscopic examination performed.   Resulting Agency [facility]              Specimen Collected: 09/15/23 15:03 EDT Last Resulted: 09/19/23 11:02 EDT         Anaerobic Culture - Pleural Fluid, Pleural Cavity  Order: [id number]  Status: Final result       Visible to patient: Yes (not seen)       Next appt: 10/06/2023 at 11:00 AM in Pulmonology (Belkis Lamb, APRN)    Specimen Information: Pleural Cavity; Pleural Fluid   0 Result Notes  Anaerobic Culture No anaerobes isolated at 5 days           Resulting Agency: [facility]           Specimen Collected: 09/15/23 15:03 EDT Last Resulted: 09/20/23 06:47 EDT         Fungus Culture - Body Fluid, Pleural Cavity  Order: [id number]  Status: Preliminary result       Visible to patient: No (not released)       Next appt: 10/06/2023 at 11:00 AM in Pulmonology (Belkis Lamb, APRN)    Specimen Information: Pleural Cavity; Body Fluid   0 Result Notes  Fungus Culture No fungus isolated at 2 weeks           Resulting Agency: [facility]           Specimen Collected: 09/15/23 15:02 EDT Last Resulted: 09/29/23 15:15 EDT           AFB Culture - Body Fluid, Pleural Cavity  Order: [id number]  Status: Preliminary result       Visible to patient: No (not released)       Next appt: 10/06/2023 at 11:00 AM in Pulmonology (Belkis Lamb, APRN)    Specimen Information: Pleural Cavity; Body Fluid   0 Result Notes  AFB Culture No AFB  isolated at 2 weeks               AFB Stain No acid fast bacilli seen on direct smear              Resulting Agency: [facility]           Specimen Collected: 09/15/23 15:02 EDT Last Resulted: 09/29/23 15:15 EDT           CT Chest With Contrast Diagnostic [[id number]] (Order [id number])  Order  Status: Final result     Appointment Information    Display Notes    V-AR  Nilda/Manolo                  PACS Images     Radiology Images    Study Result    Narrative & Impression   PROCEDURE:  CT CHEST W CONTRAST DIAGNOSTIC     COMPARISON:  [facility], PET, SKULL BASE TO MID THIGH INITIAL, 9/24/2020, 9:14.    [facility], CR, XR CHEST 2 VW, 9/14/2023, 10:22.  [facility], CT, CHEST W/O CONTRAST, 9/21/2020, 13:11.     INDICATIONS:  pleural effusion, post thoracentesis     TECHNIQUE:    CT images were obtained with non-ionic intravenous contrast material.       PROTOCOL:     Standard imaging protocol performed                 RADIATION:      DLP: 202mGy*cm               Automated exposure control was utilized to minimize radiation dose.   CONTRAST:      75cc Isovue 370 I.V.     FINDINGS:          Lung window images reveal mild centrilobular emphysema.     Patchy airspace disease in the posterior left lower lobe with crowded bronchi suggest significant   element of atelectasis.  2.5 cm rounded density in the posterior lateral left lower lobe may   represent mass or consolidation.  The area of the 8 mm noncalcified nodule seen on 9/24/2020 and   9/21/2020 is obscured.     The patient is reportedly post thoracentesis.  No pneumothorax is seen.     Mediastinal windows reveal no mediastinal, hilar, or axillary adenopathy.     The liver is of diffusely diminished density consistent with mild fatty infiltration.     Mild degenerative spurring is seen in the thoracic spine.     CONTINUED ON NEXT PAGE...           IMPRESSION:                 CT scan of the chest with IV contrast  demonstrating patchy airspace disease in the left lower lobe.    The area of the 8 mm noncalcified nodule seen on 09/20 is obscured.  Continued follow-up is   recommended.     The patient is reportedly post thoracentesis.  No pneumothorax is seen.     Fatty liver.              BARBARA CORTES MD         Electronically Signed and Approved By: BARBARA CORTES MD on 9/15/2023 at 15:59          ASSESSMENT         Patient Active Problem List   Diagnosis    Trochanteric bursitis of right hip    Pleural effusion       Encounter Diagnoses   Name Primary?    Pleural effusion Yes    Lung nodules     Atelectasis       PLAN  -followup CT scan in 3 months   -Reviewed with patient thoracentesis results, preliminary for AFB and fungal with no growth to date  -We will notify patient of PFT results when available  -We will notify patient of AFB and fungal results as available  -Patient to continue with Trelegy 200 rinsing out her mouth after each use to prevent oral thrush  -Patient to continue with albuterol inhaler as needed for shortness of air or wheeze  -We will plan to have patient follow-up after CT to review results or sooner if needed    Diagnoses and all orders for this visit:    1. Pleural effusion (Primary)  -     CT Chest Without Contrast; Future    2. Lung nodules  -     CT Chest Without Contrast; Future    3. Atelectasis  -     CT Chest Without Contrast; Future           Smoking status: Current  Vaccination status: Up to date with COVID and flu vaccine  Medications personally reviewed    Follow Up  Return in about 3 months (around 1/6/2024).    Patient was given instructions and counseling regarding her condition or for health maintenance advice. Please see specific information pulled into the AVS if appropriate.

## 2023-10-06 ENCOUNTER — OFFICE VISIT (OUTPATIENT)
Dept: PULMONOLOGY | Facility: CLINIC | Age: 58
End: 2023-10-06
Payer: MEDICARE

## 2023-10-06 VITALS
BODY MASS INDEX: 28.16 KG/M2 | SYSTOLIC BLOOD PRESSURE: 133 MMHG | HEART RATE: 100 BPM | OXYGEN SATURATION: 94 % | DIASTOLIC BLOOD PRESSURE: 92 MMHG | TEMPERATURE: 97.7 F | WEIGHT: 185.8 LBS | HEIGHT: 68 IN | RESPIRATION RATE: 18 BRPM

## 2023-10-06 DIAGNOSIS — J90 PLEURAL EFFUSION: Primary | ICD-10-CM

## 2023-10-06 DIAGNOSIS — J98.11 ATELECTASIS: ICD-10-CM

## 2023-10-06 DIAGNOSIS — R91.8 LUNG NODULES: ICD-10-CM

## 2023-10-06 LAB
FUNGUS WND CULT: NORMAL
MYCOBACTERIUM SPEC CULT: NORMAL
NIGHT BLUE STAIN TISS: NORMAL

## 2023-10-06 PROCEDURE — 1159F MED LIST DOCD IN RCRD: CPT | Performed by: NURSE PRACTITIONER

## 2023-10-06 PROCEDURE — 99214 OFFICE O/P EST MOD 30 MIN: CPT | Performed by: NURSE PRACTITIONER

## 2023-10-06 PROCEDURE — 1160F RVW MEDS BY RX/DR IN RCRD: CPT | Performed by: NURSE PRACTITIONER

## 2023-10-13 LAB
FUNGUS WND CULT: NORMAL
MYCOBACTERIUM SPEC CULT: NORMAL
NIGHT BLUE STAIN TISS: NORMAL

## 2023-10-20 LAB
MYCOBACTERIUM SPEC CULT: NORMAL
NIGHT BLUE STAIN TISS: NORMAL

## 2023-10-26 ENCOUNTER — TELEPHONE (OUTPATIENT)
Dept: PULMONOLOGY | Facility: CLINIC | Age: 58
End: 2023-10-26
Payer: MEDICARE

## 2023-10-27 LAB
MYCOBACTERIUM SPEC CULT: NORMAL
NIGHT BLUE STAIN TISS: NORMAL

## 2023-12-18 ENCOUNTER — HOSPITAL ENCOUNTER (OUTPATIENT)
Dept: CT IMAGING | Facility: HOSPITAL | Age: 58
Discharge: HOME | End: 2023-12-18
Admitting: NURSE PRACTITIONER
Payer: MEDICARE

## 2023-12-18 DIAGNOSIS — J98.11 ATELECTASIS: ICD-10-CM

## 2023-12-18 DIAGNOSIS — J90 PLEURAL EFFUSION: ICD-10-CM

## 2023-12-18 DIAGNOSIS — R91.8 LUNG NODULES: ICD-10-CM

## 2023-12-18 PROCEDURE — 71250 CT THORAX DX C-: CPT

## 2024-06-05 ENCOUNTER — TRANSCRIBE ORDERS (OUTPATIENT)
Dept: ADMINISTRATIVE | Facility: HOSPITAL | Age: 59
End: 2024-06-05
Payer: MEDICARE

## 2024-06-05 DIAGNOSIS — J90 PLEURAL EFFUSION: Primary | ICD-10-CM

## 2024-06-12 ENCOUNTER — HOSPITAL ENCOUNTER (OUTPATIENT)
Dept: CT IMAGING | Facility: HOSPITAL | Age: 59
Discharge: HOME | End: 2024-06-12
Admitting: NURSE PRACTITIONER
Payer: MEDICARE

## 2024-06-12 DIAGNOSIS — J90 PLEURAL EFFUSION: ICD-10-CM

## 2024-06-12 PROCEDURE — 71250 CT THORAX DX C-: CPT

## 2024-08-06 ENCOUNTER — TRANSCRIBE ORDERS (OUTPATIENT)
Dept: ADMINISTRATIVE | Facility: HOSPITAL | Age: 59
End: 2024-08-06
Payer: MEDICARE

## 2024-08-06 DIAGNOSIS — Z12.2 SCREENING FOR LUNG CANCER: Primary | ICD-10-CM

## 2024-08-12 ENCOUNTER — HOSPITAL ENCOUNTER (OUTPATIENT)
Dept: CT IMAGING | Facility: HOSPITAL | Age: 59
Discharge: HOME | End: 2024-08-12
Admitting: NURSE PRACTITIONER
Payer: MEDICARE

## 2024-08-12 DIAGNOSIS — Z12.2 SCREENING FOR LUNG CANCER: ICD-10-CM

## 2024-08-12 PROCEDURE — 71271 CT THORAX LUNG CANCER SCR C-: CPT

## 2024-08-23 ENCOUNTER — TELEPHONE (OUTPATIENT)
Dept: PULMONOLOGY | Facility: CLINIC | Age: 59
End: 2024-08-23
Payer: MEDICARE

## 2024-08-23 NOTE — TELEPHONE ENCOUNTER
----- Message from Belkis Lamb sent at 8/23/2024  1:33 PM EDT -----  Please get patient scheduled for a follow-up appointment in the next 2-3 weeks to discuss her CT scan  ----- Message -----  From: Genaro Hinojosa MA  Sent: 8/23/2024   1:06 PM EDT  To: WHITNEY Ortiz    PCP sent over imaging for review

## 2025-01-31 ENCOUNTER — TRANSCRIBE ORDERS (OUTPATIENT)
Dept: ADMINISTRATIVE | Facility: HOSPITAL | Age: 60
End: 2025-01-31
Payer: MEDICARE

## 2025-01-31 DIAGNOSIS — R91.8 LUNG MASS: Primary | ICD-10-CM

## 2025-02-12 ENCOUNTER — HOSPITAL ENCOUNTER (OUTPATIENT)
Dept: CT IMAGING | Facility: HOSPITAL | Age: 60
Discharge: HOME | End: 2025-02-12
Admitting: NURSE PRACTITIONER
Payer: MEDICARE

## 2025-02-12 DIAGNOSIS — R91.8 LUNG MASS: ICD-10-CM

## 2025-02-12 PROCEDURE — 71250 CT THORAX DX C-: CPT

## 2025-05-09 ENCOUNTER — TRANSCRIBE ORDERS (OUTPATIENT)
Dept: ADMINISTRATIVE | Facility: HOSPITAL | Age: 60
End: 2025-05-09
Payer: MEDICARE

## 2025-05-09 DIAGNOSIS — R91.8 LUNG MASS: Primary | ICD-10-CM
